# Patient Record
(demographics unavailable — no encounter records)

---

## 2025-06-13 NOTE — ASSESSMENT
[FreeTextEntry1] : Patient has a right index finger trigger digit.  The plan will be trigger finger release.  Risk and benefits of surgery got limited to bleeding infection risk injury and stiffness discussed with the patient.  Postoperative course is discussed.  She will see me back at the time of surgical intervention.  Carried out on the local anesthesia.  Stiffness was discussed at the surgery

## 2025-06-13 NOTE — HISTORY OF PRESENT ILLNESS
[de-identified] : 70-year-old female with right-sided index finger trigger digit.  Has had injections in the past.  Injections have not helped.  She recently has had an increase in pain and discomfort.  She comes in for an evaluation today.  She does not want another cortisone injection.

## 2025-06-13 NOTE — PHYSICAL EXAM
[de-identified] : Patient is tender to palpation at the A1 pulley right index finger.  There is stiffness.  There is triggering.  There is swelling at the A1 pulley level.  There is no Dupuytren's.

## 2025-07-14 NOTE — PHYSICAL EXAM
[de-identified] : Physical exam of her his right index finger:  Resolving swelling and ecchymosis.  The wound is clean and dry.  No signs of drainage, pus or infection. Good motion of the digits.

## 2025-07-14 NOTE — HISTORY OF PRESENT ILLNESS
[de-identified] : Patient is a 70 year F here for her first PO appt. She is status post a right index TFR done by Dr. Rogers. She is doing well.

## 2025-07-14 NOTE — DISCUSSION/SUMMARY
[de-identified] : Today I removed the sutures.   The patient will work on range of motion and scar massage.   Swelling of the digit can linger for 4-6 weeks.  Follow up prn if there is continued pain and/or swelling at that time.